# Patient Record
(demographics unavailable — no encounter records)

---

## 2025-02-21 NOTE — HISTORY OF PRESENT ILLNESS
[de-identified] :  ANKITA FANG is a 58 year old patient seen initially for a 6 week history of popping in his left ear. There was no preceding event and it occurs several times throughout the day. He has not had recent air travel. No hearing changes. He denies otalgia, otorrhea, tinnitus, or clogged sensation. He does have nasal congestion and does not use any nasal sprays.   ENT history  He does have a history of clenching. He uses a nightguard. No History of recurrent ear infections No Prior ear surgery No History of otologic trauma No Noise exposure No Prior audiogram No history of Smoking or vaping He does use Qtips

## 2025-02-21 NOTE — ASSESSMENT
[FreeTextEntry1] : He has a 6-week history of a popping sensation in the left ear.  He does have chronic rhinitis.  He has no other symptoms.  On exam, there was a hair on the left side contacting the tympanic membrane which was removed.  Audiogram was normal.  Flexible laryngoscopy showed clear drainage in the nasopharynx but was otherwise unremarkable.  The symptoms may have been due to the hair in the left ear or possible eustachian tube dysfunction.  He also has chronic rhinitis and TMJ dysfunction which can also cause a pressure or popping in the ear.  Plan -Findings and management options were discussed with the patient. - Good ear hygiene reviewed - Avoid using cotton swabs in the ears - Noise precautions - Monitor hearing - He may try nasal steroid spray and/or decongestant or antihistamine - I have asked him to call me in approximately 2 weeks if the symptoms have not resolved.  He should call and return earlier if any problems or worsening symptoms.

## 2025-02-21 NOTE — PHYSICAL EXAM
[TextEntry] : PHYSICAL EXAM   General: The patient was alert, oriented and in no distress. Voice was clear.   Face: The patient had no facial asymmetry or mass. The skin was unremarkable.   Ears: Hearing normal to conversational voice External ears were normal without deformity. External ear canals: AD- normal. no cerumen or inflammation.   Tympanic membranes: AD- intact. no perforation or effusion  PROCEDURE- EAR MICROSCOPY left ear  Indication: hair removal Under the microscope, a small hair was removed atraumatically from the left ear with forceps. Canal: normal. no inflammation. Tympanic membrane: Intact. no perforation or effusion.   Nose: The external nose had no significant deformity.  There was no facial tenderness. On anterior rhinoscopy, the nasal mucosa was normal. The anterior septum was grossly midline. There were no visualized polyps, purulence  or masses.   Oral cavity: Oral mucosa- normal. Oral and base of tongue- clear and without mass. Gingival and buccal mucosa- moist and without lesions. Palate- the palate moved well. There was no cleft palate. There appeared to be good salivary flow.  Oral cavity/oropharynx- no pus, erythema or mass   Neck: The neck was symmetrical. The parotid and submandibular glands were normal without masses. The trachea was midline and there was no unusual crepitus. Thyroid was smooth and nontender and no masses were palpated. No masses   Lymphatics: Cervical adenopathy- none.    PROCEDURE:  FLEXIBLE LARYNGOSCOPY, NASAL ENDOSCOPY   Surgeon: Dr. Donnelly Indication: chronic rhinitis, ear popping, assess for lesions, inadequate exam on anterior rhinoscopy Anesthetic: Topical lidocaine and Afrin Procedure: The patient was placed in a sitting position.  Following application of the topical anesthetic and decongestant, exam was performed with a flexible telescope.  The scope was passed along the left nasal floor to the nasopharynx.  It was then passed into the region of the middle meatus, middle turbinate, and sphenoethmoid region. The following findings were noted:   Nasal mucosa:  mild edema Septum: grossly midline   Left nasal cavity      Inferior turbinate:  hypertrophic      Middle turbinate: normal      Superior turbinate: normal      Inferior meatus: no pus, polyps or congestion      Middle meatus: no pus, polyps, or congestion      Superior meatus:  no pus, polyps, or congestion      Sphenoethmoidal recess: no pus, polyps or congestion   Nasopharynx: no masses, choanae patent, no adenoid tissue. Clear mucous in the nasopharynx     Base of tongue and vallecula: no masses or asymmetry Posterior pharyngeal wall: no masses. Hypopharynx: symmetrical. No masses Pyriform sinuses: no lesions or pooling of secretions Epiglottis: normal. No edema or lesions Aryepiglottic folds: normal. No lesions. True vocal cords: clear and mobile. No lesions. Airway patent False vocal cords: normal Ventricles: no masses. Arytenoids: normal Interarytenoid area: no masses. mild edema Subglottis: normal. no masses